# Patient Record
Sex: FEMALE | Race: WHITE | NOT HISPANIC OR LATINO | Employment: UNEMPLOYED | URBAN - NONMETROPOLITAN AREA
[De-identification: names, ages, dates, MRNs, and addresses within clinical notes are randomized per-mention and may not be internally consistent; named-entity substitution may affect disease eponyms.]

---

## 2017-02-06 ENCOUNTER — TRANSCRIBE ORDERS (OUTPATIENT)
Dept: ADMINISTRATIVE | Facility: HOSPITAL | Age: 42
End: 2017-02-06

## 2017-02-06 DIAGNOSIS — Z12.31 VISIT FOR SCREENING MAMMOGRAM: Primary | ICD-10-CM

## 2017-02-14 ENCOUNTER — HOSPITAL ENCOUNTER (OUTPATIENT)
Dept: MAMMOGRAPHY | Facility: HOSPITAL | Age: 42
End: 2017-02-14

## 2017-06-08 ENCOUNTER — TRANSCRIBE ORDERS (OUTPATIENT)
Dept: ADMINISTRATIVE | Facility: HOSPITAL | Age: 42
End: 2017-06-08

## 2017-06-08 DIAGNOSIS — R25.1 TREMOR: Primary | ICD-10-CM

## 2017-06-15 ENCOUNTER — HOSPITAL ENCOUNTER (OUTPATIENT)
Dept: MRI IMAGING | Facility: HOSPITAL | Age: 42
Discharge: HOME | End: 2017-06-15
Attending: PSYCHIATRY & NEUROLOGY

## 2017-07-27 ENCOUNTER — APPOINTMENT (OUTPATIENT)
Dept: MRI IMAGING | Facility: HOSPITAL | Age: 42
End: 2017-07-27
Attending: PSYCHIATRY & NEUROLOGY

## 2017-07-31 ENCOUNTER — HOSPITAL ENCOUNTER (OUTPATIENT)
Dept: MRI IMAGING | Facility: HOSPITAL | Age: 42
Discharge: HOME | End: 2017-07-31
Attending: PSYCHIATRY & NEUROLOGY | Admitting: PSYCHIATRY & NEUROLOGY

## 2017-07-31 DIAGNOSIS — R25.1 TREMOR: ICD-10-CM

## 2017-07-31 PROCEDURE — A9577 INJ MULTIHANCE: HCPCS | Performed by: PSYCHIATRY & NEUROLOGY

## 2017-07-31 PROCEDURE — 0 GADOBENATE DIMEGLUMINE 529 MG/ML SOLUTION: Performed by: PSYCHIATRY & NEUROLOGY

## 2017-07-31 PROCEDURE — 70553 MRI BRAIN STEM W/O & W/DYE: CPT

## 2017-07-31 PROCEDURE — 70553 MRI BRAIN STEM W/O & W/DYE: CPT | Performed by: RADIOLOGY

## 2017-07-31 RX ADMIN — GADOBENATE DIMEGLUMINE 20 ML: 529 INJECTION, SOLUTION INTRAVENOUS at 09:40

## 2018-04-19 ENCOUNTER — HOSPITAL ENCOUNTER (OUTPATIENT)
Dept: GENERAL RADIOLOGY | Facility: HOSPITAL | Age: 43
Discharge: HOME | End: 2018-04-19
Attending: PSYCHIATRY & NEUROLOGY

## 2018-04-19 ENCOUNTER — TRANSCRIBE ORDERS (OUTPATIENT)
Dept: LAB | Facility: HOSPITAL | Age: 43
End: 2018-04-19

## 2018-04-19 ENCOUNTER — HOSPITAL ENCOUNTER (OUTPATIENT)
Dept: GENERAL RADIOLOGY | Facility: HOSPITAL | Age: 43
Discharge: HOME | End: 2018-04-19
Attending: PSYCHIATRY & NEUROLOGY | Admitting: PSYCHIATRY & NEUROLOGY

## 2018-04-19 DIAGNOSIS — M54.9 BACK PAIN, UNSPECIFIED BACK LOCATION, UNSPECIFIED BACK PAIN LATERALITY, UNSPECIFIED CHRONICITY: Primary | ICD-10-CM

## 2018-04-19 DIAGNOSIS — M54.9 BACK PAIN, UNSPECIFIED BACK LOCATION, UNSPECIFIED BACK PAIN LATERALITY, UNSPECIFIED CHRONICITY: ICD-10-CM

## 2018-04-19 DIAGNOSIS — M54.2 NECK PAIN: ICD-10-CM

## 2018-04-19 PROCEDURE — 72110 X-RAY EXAM L-2 SPINE 4/>VWS: CPT | Performed by: RADIOLOGY

## 2018-04-19 PROCEDURE — 72110 X-RAY EXAM L-2 SPINE 4/>VWS: CPT

## 2018-04-19 PROCEDURE — 72050 X-RAY EXAM NECK SPINE 4/5VWS: CPT

## 2018-04-19 PROCEDURE — 72052 X-RAY EXAM NECK SPINE 6/>VWS: CPT | Performed by: RADIOLOGY

## 2018-05-14 ENCOUNTER — OFFICE VISIT (OUTPATIENT)
Dept: ORTHOPEDIC SURGERY | Facility: CLINIC | Age: 43
End: 2018-05-14

## 2018-05-14 VITALS
HEART RATE: 88 BPM | WEIGHT: 280 LBS | SYSTOLIC BLOOD PRESSURE: 122 MMHG | BODY MASS INDEX: 43.95 KG/M2 | HEIGHT: 67 IN | DIASTOLIC BLOOD PRESSURE: 90 MMHG

## 2018-05-14 DIAGNOSIS — G56.03 BILATERAL CARPAL TUNNEL SYNDROME: Primary | ICD-10-CM

## 2018-05-14 PROCEDURE — 99406 BEHAV CHNG SMOKING 3-10 MIN: CPT | Performed by: ORTHOPAEDIC SURGERY

## 2018-05-14 PROCEDURE — 99204 OFFICE O/P NEW MOD 45 MIN: CPT | Performed by: ORTHOPAEDIC SURGERY

## 2018-05-14 RX ORDER — BUPROPION HYDROCHLORIDE 150 MG/1
TABLET ORAL
COMMUNITY
Start: 2018-03-28 | End: 2018-06-05

## 2018-05-14 RX ORDER — ALBUTEROL SULFATE 90 UG/1
2 AEROSOL, METERED RESPIRATORY (INHALATION) EVERY 4 HOURS PRN
COMMUNITY
Start: 2018-05-04

## 2018-05-14 RX ORDER — OMEPRAZOLE 20 MG/1
20 CAPSULE, DELAYED RELEASE ORAL DAILY
COMMUNITY
Start: 2018-05-04

## 2018-05-14 RX ORDER — PRIMIDONE 250 MG/1
62.5 TABLET ORAL 2 TIMES DAILY
COMMUNITY
Start: 2018-03-12

## 2018-05-14 RX ORDER — NABUMETONE 750 MG/1
750 TABLET, FILM COATED ORAL 2 TIMES DAILY PRN
COMMUNITY
Start: 2018-05-04

## 2018-05-14 RX ORDER — FENOFIBRATE 54 MG/1
54 TABLET ORAL DAILY
COMMUNITY
Start: 2018-05-04

## 2018-05-14 RX ORDER — AMITRIPTYLINE HYDROCHLORIDE 25 MG/1
25 TABLET, FILM COATED ORAL NIGHTLY
COMMUNITY
Start: 2018-03-28

## 2018-05-14 RX ORDER — ERGOCALCIFEROL 1.25 MG/1
50000 CAPSULE ORAL
COMMUNITY
Start: 2018-05-04

## 2018-05-14 RX ORDER — LISINOPRIL 10 MG/1
10 TABLET ORAL DAILY
COMMUNITY
Start: 2018-05-04

## 2018-05-14 RX ORDER — GABAPENTIN 600 MG/1
600 TABLET ORAL 3 TIMES DAILY
COMMUNITY
Start: 2018-05-04

## 2018-05-14 RX ORDER — METHOCARBAMOL 750 MG/1
750 TABLET, FILM COATED ORAL 4 TIMES DAILY
COMMUNITY
Start: 2018-05-04

## 2018-05-14 NOTE — PROGRESS NOTES
History and Physical      Patient: Betty Horne  YOB: 1975  Date of Encounter: 05/14/2018      HPI:   Betty Horne, 43 y.o. female, referred today by Faye Whitten evaluation of bilateral hand pain left worse than the right.  Reports symptoms dating back to 2014 describing constant pain which often awakens her from sleep.  She has tried night bracing without improvement.  Experiences tingling and numbness in both hands with associated pain.  He also has discomfort while driving.  She has no history of significant neck pain.    Active Problem List:  Patient Active Problem List   Diagnosis   • Bilateral carpal tunnel syndrome       Past Medical History:  Past Medical History:   Diagnosis Date   • Bilateral wrist pain    • Diabetes    • GERD (gastroesophageal reflux disease)    • High cholesterol    • Hypertension    • Osteoarthritis        Past Surgical History:  Past Surgical History:   Procedure Laterality Date   • CHOLECYSTECTOMY         Family History:  Family History   Problem Relation Age of Onset   • Osteoarthritis Mother    • Osteoporosis Mother    • Heart disease Mother    • Hypertension Mother    • Osteoarthritis Father    • Heart disease Father    • Diabetes Father    • Hypertension Father    • Osteoarthritis Sister    • Osteoarthritis Maternal Grandmother    • Osteoporosis Maternal Grandmother    • Heart disease Maternal Grandmother    • Heart disease Maternal Grandfather    • Hypertension Maternal Grandfather        Social History:  Social History     Social History   • Marital status:      Spouse name: N/A   • Number of children: N/A   • Years of education: N/A     Occupational History   • Not on file.     Social History Main Topics   • Smoking status: Current Some Day Smoker     Packs/day: 1.50     Years: 17.00   • Smokeless tobacco: Never Used   • Alcohol use Yes      Comment: Occasional and Social    • Drug use: No   • Sexual activity: Not on file     Other Topics Concern   •  Not on file     Social History Narrative   • No narrative on file   I advised the patient of the risks in continuing to use tobacco, and I provided this patient with smoking cessation educational materials.    During this visit, I spent 3 minutes counseling the patient regarding smoking cessation.    Patient's Body mass index is 43.85 kg/m². BMI is above normal parameters. Recommendations include: educational material.        Medications:  Current Outpatient Prescriptions   Medication Sig Dispense Refill   • amitriptyline (ELAVIL) 25 MG tablet      • buPROPion XL (WELLBUTRIN XL) 150 MG 24 hr tablet      • fenofibrate (TRICOR) 54 MG tablet      • gabapentin (NEURONTIN) 600 MG tablet      • lisinopril (PRINIVIL,ZESTRIL) 10 MG tablet      • metFORMIN (GLUCOPHAGE) 500 MG tablet      • methocarbamol (ROBAXIN) 750 MG tablet      • mupirocin (BACTROBAN) 2 % ointment      • nabumetone (RELAFEN) 750 MG tablet      • omeprazole (priLOSEC) 20 MG capsule      • primidone (MYSOLINE) 250 MG tablet      • VENTOLIN  (90 Base) MCG/ACT inhaler      • vitamin D (ERGOCALCIFEROL) 29410 units capsule capsule        No current facility-administered medications for this visit.        Allergies:  No Known Allergies    Review of Systems:   Review of Systems   Constitutional: Positive for fatigue.   HENT: Positive for sinus pain.    Eyes: Negative.    Respiratory: Negative.    Cardiovascular: Positive for palpitations (none currently).   Gastrointestinal: Positive for constipation and diarrhea.   Endocrine: Positive for cold intolerance and heat intolerance.   Genitourinary: Positive for frequency.   Musculoskeletal: Positive for arthralgias, back pain, gait problem, joint swelling and myalgias.   Skin: Negative.    Allergic/Immunologic: Negative.    Neurological: Positive for tremors, weakness and numbness.   Psychiatric/Behavioral: Positive for dysphoric mood. The patient is nervous/anxious.        Physical Exam:   Physical Exam  "  Constitutional: She is oriented to person, place, and time. She appears well-developed and well-nourished. No distress.   Eyes: Pupils are equal, round, and reactive to light. Right eye exhibits no discharge. Left eye exhibits no discharge.   Neck: Normal range of motion. Neck supple.   Cardiovascular: Normal rate, normal heart sounds and intact distal pulses.    No murmur heard.  Pulmonary/Chest: Effort normal and breath sounds normal. No respiratory distress. She has no wheezes.   Abdominal: Soft. She exhibits no distension.   Musculoskeletal:   Musculoskeletal:  Bilateral hand evaluation shows slight thenar atrophy on the left compared to the right.  She has slightly diminished  strength.  Phalen's test is negative bilaterally Tinel's test is negative.  She demonstrates decreased sensation primarily to the index and middle fingers.    Neurological: She is alert and oriented to person, place, and time.   Skin: Skin is warm and dry. Capillary refill takes less than 2 seconds. She is not diaphoretic.   Psychiatric: She has a normal mood and affect. Her behavior is normal. Judgment and thought content normal.     GENERAL: 43 y.o. female, alert and oriented X 3 in no acute distress.   Visit Vitals  /90   Pulse 88   Ht 170.2 cm (67\")   Wt 127 kg (280 lb)   BMI 43.85 kg/m²       Assessment & Plan:  43 y.o. female with bilateral carpal tunnel syndrome worse on the left.  EMG nerve conduction studies confirm median neuropathy bilateral wrist which has progressed since EMG nerve conduction studies completed May 2, 2017.  Today we discussed her options. She understands risks and benefits and wished to proceed with surgery. She is scheduled for carpal tunnel release left hand on June 7, 2018.       ICD-10-CM ICD-9-CM   1. Bilateral carpal tunnel syndrome G56.03 354.0     Cc:   Faye Whitten PA-C          Scribed for Miki Holt MD by Tiffany Holt RN.9:45 AM 05/14/2018                  "

## 2018-05-16 ENCOUNTER — TELEPHONE (OUTPATIENT)
Dept: ORTHOPEDIC SURGERY | Facility: CLINIC | Age: 43
End: 2018-05-16

## 2018-05-16 NOTE — TELEPHONE ENCOUNTER
Patient is aware of PAT date/time 6-5-18@12:45. Patient stated that she might have to change this appointment due to another appointment.

## 2018-05-24 ENCOUNTER — PREP FOR SURGERY (OUTPATIENT)
Dept: OTHER | Facility: HOSPITAL | Age: 43
End: 2018-05-24

## 2018-05-24 NOTE — H&P
History and Physical      Patient: Betty Horne  YOB: 1975  Date of Encounter: 05/14/2018      HPI:   Betty Horne, 43 y.o. female, referred today by Faye Whitten evaluation of bilateral hand pain left worse than the right.  Reports symptoms dating back to 2014 describing constant pain which often awakens her from sleep.  She has tried night bracing without improvement.  Experiences tingling and numbness in both hands with associated pain.  He also has discomfort while driving.  She has no history of significant neck pain.    Active Problem List:  Patient Active Problem List   Diagnosis   • Bilateral carpal tunnel syndrome       Past Medical History:  Past Medical History:   Diagnosis Date   • Bilateral wrist pain    • Diabetes    • GERD (gastroesophageal reflux disease)    • High cholesterol    • Hypertension    • Osteoarthritis        Past Surgical History:  Past Surgical History:   Procedure Laterality Date   • CHOLECYSTECTOMY         Family History:  Family History   Problem Relation Age of Onset   • Osteoarthritis Mother    • Osteoporosis Mother    • Heart disease Mother    • Hypertension Mother    • Osteoarthritis Father    • Heart disease Father    • Diabetes Father    • Hypertension Father    • Osteoarthritis Sister    • Osteoarthritis Maternal Grandmother    • Osteoporosis Maternal Grandmother    • Heart disease Maternal Grandmother    • Heart disease Maternal Grandfather    • Hypertension Maternal Grandfather        Social History:  Social History     Social History   • Marital status:      Spouse name: N/A   • Number of children: N/A   • Years of education: N/A     Occupational History   • Not on file.     Social History Main Topics   • Smoking status: Current Some Day Smoker     Packs/day: 1.50     Years: 17.00   • Smokeless tobacco: Never Used   • Alcohol use Yes      Comment: Occasional and Social    • Drug use: No   • Sexual activity: Not on file     Other Topics Concern   •  Not on file     Social History Narrative   • No narrative on file   I advised the patient of the risks in continuing to use tobacco, and I provided this patient with smoking cessation educational materials.    During this visit, I spent 3 minutes counseling the patient regarding smoking cessation.    Patient's Body mass index is 43.85 kg/m². BMI is above normal parameters. Recommendations include: educational material.        Medications:  Current Outpatient Prescriptions   Medication Sig Dispense Refill   • amitriptyline (ELAVIL) 25 MG tablet      • buPROPion XL (WELLBUTRIN XL) 150 MG 24 hr tablet      • fenofibrate (TRICOR) 54 MG tablet      • gabapentin (NEURONTIN) 600 MG tablet      • lisinopril (PRINIVIL,ZESTRIL) 10 MG tablet      • metFORMIN (GLUCOPHAGE) 500 MG tablet      • methocarbamol (ROBAXIN) 750 MG tablet      • mupirocin (BACTROBAN) 2 % ointment      • nabumetone (RELAFEN) 750 MG tablet      • omeprazole (priLOSEC) 20 MG capsule      • primidone (MYSOLINE) 250 MG tablet      • VENTOLIN  (90 Base) MCG/ACT inhaler      • vitamin D (ERGOCALCIFEROL) 69030 units capsule capsule        No current facility-administered medications for this visit.        Allergies:  No Known Allergies    Review of Systems:   Review of Systems   Constitutional: Positive for fatigue.   HENT: Positive for sinus pain.    Eyes: Negative.    Respiratory: Negative.    Cardiovascular: Positive for palpitations (none currently).   Gastrointestinal: Positive for constipation and diarrhea.   Endocrine: Positive for cold intolerance and heat intolerance.   Genitourinary: Positive for frequency.   Musculoskeletal: Positive for arthralgias, back pain, gait problem, joint swelling and myalgias.   Skin: Negative.    Allergic/Immunologic: Negative.    Neurological: Positive for tremors, weakness and numbness.   Psychiatric/Behavioral: Positive for dysphoric mood. The patient is nervous/anxious.        Physical Exam:   Physical Exam  "  Constitutional: She is oriented to person, place, and time. She appears well-developed and well-nourished. No distress.   Eyes: Pupils are equal, round, and reactive to light. Right eye exhibits no discharge. Left eye exhibits no discharge.   Neck: Normal range of motion. Neck supple.   Cardiovascular: Normal rate, normal heart sounds and intact distal pulses.    No murmur heard.  Pulmonary/Chest: Effort normal and breath sounds normal. No respiratory distress. She has no wheezes.   Abdominal: Soft. She exhibits no distension.   Musculoskeletal:   Musculoskeletal:  Bilateral hand evaluation shows slight thenar atrophy on the left compared to the right.  She has slightly diminished  strength.  Phalen's test is negative bilaterally Tinel's test is negative.  She demonstrates decreased sensation primarily to the index and middle fingers.    Neurological: She is alert and oriented to person, place, and time.   Skin: Skin is warm and dry. Capillary refill takes less than 2 seconds. She is not diaphoretic.   Psychiatric: She has a normal mood and affect. Her behavior is normal. Judgment and thought content normal.     GENERAL: 43 y.o. female, alert and oriented X 3 in no acute distress.   Visit Vitals  /90   Pulse 88   Ht 170.2 cm (67\")   Wt 127 kg (280 lb)   BMI 43.85 kg/m²       Assessment & Plan:  43 y.o. female with bilateral carpal tunnel syndrome worse on the left.  EMG nerve conduction studies confirm median neuropathy bilateral wrist which has progressed since EMG nerve conduction studies completed May 2, 2017.  Today we discussed her options. She understands risks and benefits and wished to proceed with surgery. She is scheduled for carpal tunnel release left hand on June 7, 2018.       ICD-10-CM ICD-9-CM   1. Bilateral carpal tunnel syndrome G56.03 354.0     Cc:   Faye Whitten PA-C          Scribed for Miki Holt MD by Tiffany Holt RN.9:45 AM 05/14/2018    "

## 2018-06-05 ENCOUNTER — APPOINTMENT (OUTPATIENT)
Dept: PREADMISSION TESTING | Facility: HOSPITAL | Age: 43
End: 2018-06-05

## 2018-06-05 LAB
ANION GAP SERPL CALCULATED.3IONS-SCNC: 1.9 MMOL/L (ref 3.6–11.2)
BUN BLD-MCNC: 10 MG/DL (ref 7–21)
BUN/CREAT SERPL: 15.4 (ref 7–25)
CALCIUM SPEC-SCNC: 9.5 MG/DL (ref 7.7–10)
CHLORIDE SERPL-SCNC: 109 MMOL/L (ref 99–112)
CO2 SERPL-SCNC: 27.1 MMOL/L (ref 24.3–31.9)
CREAT BLD-MCNC: 0.65 MG/DL (ref 0.43–1.29)
DEPRECATED RDW RBC AUTO: 40 FL (ref 37–54)
ERYTHROCYTE [DISTWIDTH] IN BLOOD BY AUTOMATED COUNT: 12.5 % (ref 11.5–14.5)
GFR SERPL CREATININE-BSD FRML MDRD: 100 ML/MIN/1.73
GLUCOSE BLD-MCNC: 76 MG/DL (ref 70–110)
HCT VFR BLD AUTO: 44.3 % (ref 37–47)
HGB BLD-MCNC: 15.3 G/DL (ref 12–16)
MCH RBC QN AUTO: 30.8 PG (ref 27–33)
MCHC RBC AUTO-ENTMCNC: 34.5 G/DL (ref 33–37)
MCV RBC AUTO: 89.1 FL (ref 80–94)
OSMOLALITY SERPL CALC.SUM OF ELEC: 273.5 MOSM/KG (ref 273–305)
PLATELET # BLD AUTO: 251 10*3/MM3 (ref 130–400)
PMV BLD AUTO: 11.6 FL (ref 6–10)
POTASSIUM BLD-SCNC: 4.1 MMOL/L (ref 3.5–5.3)
RBC # BLD AUTO: 4.97 10*6/MM3 (ref 4.2–5.4)
SODIUM BLD-SCNC: 138 MMOL/L (ref 135–153)
WBC NRBC COR # BLD: 10.14 10*3/MM3 (ref 4.5–12.5)

## 2018-06-05 PROCEDURE — 36415 COLL VENOUS BLD VENIPUNCTURE: CPT

## 2018-06-05 PROCEDURE — 93010 ELECTROCARDIOGRAM REPORT: CPT | Performed by: INTERNAL MEDICINE

## 2018-06-05 PROCEDURE — 80048 BASIC METABOLIC PNL TOTAL CA: CPT | Performed by: ORTHOPAEDIC SURGERY

## 2018-06-05 PROCEDURE — 93005 ELECTROCARDIOGRAM TRACING: CPT

## 2018-06-05 PROCEDURE — 85027 COMPLETE CBC AUTOMATED: CPT | Performed by: ORTHOPAEDIC SURGERY

## 2018-06-05 NOTE — DISCHARGE INSTRUCTIONS
TAKE the following medications the morning of surgery:  All heart or blood pressure medications    Please discontinue all blood thinners and anticoagulants (except aspirin) prior to surgery as per your surgeon and cardiologist instructions.  Aspirin may be continued up to the day prior to surgery.    HOLD all diabetic medications the morning of surgery as order by physician.    Please follow instructions on use of prep cloths provided by nurse. Return instruction sheet with stickers attached to pre-op nurse on day of surgery.    Arrival time for surgery on 6/7/2018 is 0930 am.    General Instructions:  • Do NOT eat or drink after midnight which includes water, mints, or gum.  • You may brush your teeth. Dental appliances that are removable must be taken out day of surgery.  • Do NOT smoke, chew tobacco, or drink alcohol within 24 hours prior to surgery.  • Bring medications in original bottles, any inhalers and if applicable your C-PAP/BI-PAP machine  • Bring any papers given to you in the doctor’s office  • Wear clean, comfortable clothes and socks  • Do NOT wear contact lenses or make-up or dark nail polish.  Bring a case for your glasses if applicable.  • Bring crutches or walker if applicable  • Leave all other valuables and jewelry at home  • If you were given a blood bank armband, continue to wear it until discharged.    Preventing a Surgical Site Infection:  • Shower the night before surgery (unless instructed otherwise) using a fresh bar of anti-bacterial soap (such as Dial) and clean washcloth.  Dry with a clean towel and dress in clean clothing.  • For 2 to 3 days before surgery, avoid shaving with a razor near where you will have surgery because the razor can irritate skin and make it easier to develop an infection.  Ask your surgeon if you will be receiving antibiotics prior to surgery.  • Make sure you, your family, and all healthcare providers clean their hands with soap and water or an alcohol-based  hand  before caring for you or your wound.  • If at all possible, quit smoking as many days before surgery as you can.    Day of Surgery:  Upon arrival, a pre-op nurse and anesthesiologist will review your health history, obtain vital signs, and answer questions you may have.  The only belongings needed at this time will be your home medications and if applicable you C-PAP/BI-PAP machine.  If you are staying overnight, your family can leave the rest of your belongings in the car and bring them to your room later.  A pre-op nurse will start an IV and you may receive medication in preparation for surgery.  Due to patient privacy and limited space, only one member of your family will be able to accompany you in the pre-op area.  While you are in surgery your family should notify the waiting room  if they leave the waiting room area and provide a contact number.  Please be aware that surgery does come with discomfort.  We want to make every effort to control your discomfort so please discuss any uncontrolled symptoms with your nurse.  Your doctor will most likely have prescribed pain medications.  If you are going home after surgery you will receive individualized written care instructions before being discharged.  A responsible adult must drive you to and from the hospital on the day of surgery and stay with you for 24 hours.  If you are staying overnight following surgery, you will be transported to your hospital room following the recovery period.

## 2018-06-06 ENCOUNTER — TELEPHONE (OUTPATIENT)
Dept: ORTHOPEDIC SURGERY | Facility: CLINIC | Age: 43
End: 2018-06-06

## 2018-06-07 ENCOUNTER — ANESTHESIA EVENT (OUTPATIENT)
Dept: PERIOP | Facility: HOSPITAL | Age: 43
End: 2018-06-07

## 2018-06-07 ENCOUNTER — ANESTHESIA (OUTPATIENT)
Dept: PERIOP | Facility: HOSPITAL | Age: 43
End: 2018-06-07

## 2018-06-07 ENCOUNTER — HOSPITAL ENCOUNTER (OUTPATIENT)
Facility: HOSPITAL | Age: 43
Setting detail: HOSPITAL OUTPATIENT SURGERY
Discharge: HOME | End: 2018-06-07
Attending: ORTHOPAEDIC SURGERY | Admitting: ORTHOPAEDIC SURGERY

## 2018-06-07 VITALS
WEIGHT: 272 LBS | HEART RATE: 73 BPM | RESPIRATION RATE: 16 BRPM | SYSTOLIC BLOOD PRESSURE: 111 MMHG | BODY MASS INDEX: 42.69 KG/M2 | HEIGHT: 67 IN | DIASTOLIC BLOOD PRESSURE: 64 MMHG | TEMPERATURE: 97.7 F | OXYGEN SATURATION: 97 %

## 2018-06-07 DIAGNOSIS — G56.03 BILATERAL CARPAL TUNNEL SYNDROME: Primary | ICD-10-CM

## 2018-06-07 LAB
B-HCG UR QL: NEGATIVE
GLUCOSE BLDC GLUCOMTR-MCNC: 90 MG/DL (ref 70–130)
INTERNAL NEGATIVE CONTROL: NEGATIVE
INTERNAL POSITIVE CONTROL: POSITIVE
Lab: NORMAL

## 2018-06-07 PROCEDURE — 82962 GLUCOSE BLOOD TEST: CPT

## 2018-06-07 PROCEDURE — 25010000002 FENTANYL CITRATE (PF) 100 MCG/2ML SOLUTION: Performed by: NURSE ANESTHETIST, CERTIFIED REGISTERED

## 2018-06-07 PROCEDURE — 94799 UNLISTED PULMONARY SVC/PX: CPT

## 2018-06-07 PROCEDURE — 25010000002 PROPOFOL 10 MG/ML EMULSION: Performed by: NURSE ANESTHETIST, CERTIFIED REGISTERED

## 2018-06-07 PROCEDURE — 64721 CARPAL TUNNEL SURGERY: CPT | Performed by: ORTHOPAEDIC SURGERY

## 2018-06-07 PROCEDURE — 25010000002 ONDANSETRON PER 1 MG: Performed by: NURSE ANESTHETIST, CERTIFIED REGISTERED

## 2018-06-07 PROCEDURE — 25010000002 MIDAZOLAM PER 1 MG: Performed by: NURSE ANESTHETIST, CERTIFIED REGISTERED

## 2018-06-07 RX ORDER — LIDOCAINE HYDROCHLORIDE 20 MG/ML
INJECTION, SOLUTION EPIDURAL; INFILTRATION; INTRACAUDAL; PERINEURAL AS NEEDED
Status: DISCONTINUED | OUTPATIENT
Start: 2018-06-07 | End: 2018-06-07 | Stop reason: SURG

## 2018-06-07 RX ORDER — FENTANYL CITRATE 50 UG/ML
50 INJECTION, SOLUTION INTRAMUSCULAR; INTRAVENOUS
Status: DISCONTINUED | OUTPATIENT
Start: 2018-06-07 | End: 2018-06-07 | Stop reason: HOSPADM

## 2018-06-07 RX ORDER — OXYCODONE HYDROCHLORIDE AND ACETAMINOPHEN 5; 325 MG/1; MG/1
1-2 TABLET ORAL EVERY 4 HOURS PRN
Qty: 20 TABLET | Refills: 0 | Status: SHIPPED | OUTPATIENT
Start: 2018-06-07

## 2018-06-07 RX ORDER — FENTANYL CITRATE 50 UG/ML
INJECTION, SOLUTION INTRAMUSCULAR; INTRAVENOUS AS NEEDED
Status: DISCONTINUED | OUTPATIENT
Start: 2018-06-07 | End: 2018-06-07 | Stop reason: SURG

## 2018-06-07 RX ORDER — BUPIVACAINE HYDROCHLORIDE 5 MG/ML
INJECTION, SOLUTION PERINEURAL AS NEEDED
Status: DISCONTINUED | OUTPATIENT
Start: 2018-06-07 | End: 2018-06-07 | Stop reason: HOSPADM

## 2018-06-07 RX ORDER — ONDANSETRON 2 MG/ML
4 INJECTION INTRAMUSCULAR; INTRAVENOUS ONCE AS NEEDED
Status: DISCONTINUED | OUTPATIENT
Start: 2018-06-07 | End: 2018-06-07 | Stop reason: HOSPADM

## 2018-06-07 RX ORDER — SODIUM CHLORIDE, SODIUM LACTATE, POTASSIUM CHLORIDE, CALCIUM CHLORIDE 600; 310; 30; 20 MG/100ML; MG/100ML; MG/100ML; MG/100ML
125 INJECTION, SOLUTION INTRAVENOUS CONTINUOUS
Status: DISCONTINUED | OUTPATIENT
Start: 2018-06-07 | End: 2018-06-07 | Stop reason: HOSPADM

## 2018-06-07 RX ORDER — IPRATROPIUM BROMIDE AND ALBUTEROL SULFATE 2.5; .5 MG/3ML; MG/3ML
3 SOLUTION RESPIRATORY (INHALATION) ONCE AS NEEDED
Status: DISCONTINUED | OUTPATIENT
Start: 2018-06-07 | End: 2018-06-07 | Stop reason: HOSPADM

## 2018-06-07 RX ORDER — MAGNESIUM HYDROXIDE 1200 MG/15ML
LIQUID ORAL AS NEEDED
Status: DISCONTINUED | OUTPATIENT
Start: 2018-06-07 | End: 2018-06-07 | Stop reason: HOSPADM

## 2018-06-07 RX ORDER — ONDANSETRON 2 MG/ML
INJECTION INTRAMUSCULAR; INTRAVENOUS AS NEEDED
Status: DISCONTINUED | OUTPATIENT
Start: 2018-06-07 | End: 2018-06-07 | Stop reason: SURG

## 2018-06-07 RX ORDER — MEPERIDINE HYDROCHLORIDE 50 MG/ML
12.5 INJECTION INTRAMUSCULAR; INTRAVENOUS; SUBCUTANEOUS
Status: DISCONTINUED | OUTPATIENT
Start: 2018-06-07 | End: 2018-06-07 | Stop reason: HOSPADM

## 2018-06-07 RX ORDER — LIDOCAINE HYDROCHLORIDE 10 MG/ML
INJECTION, SOLUTION INFILTRATION; PERINEURAL AS NEEDED
Status: DISCONTINUED | OUTPATIENT
Start: 2018-06-07 | End: 2018-06-07 | Stop reason: HOSPADM

## 2018-06-07 RX ORDER — PROPOFOL 10 MG/ML
VIAL (ML) INTRAVENOUS AS NEEDED
Status: DISCONTINUED | OUTPATIENT
Start: 2018-06-07 | End: 2018-06-07 | Stop reason: SURG

## 2018-06-07 RX ORDER — SODIUM CHLORIDE 0.9 % (FLUSH) 0.9 %
1-10 SYRINGE (ML) INJECTION AS NEEDED
Status: DISCONTINUED | OUTPATIENT
Start: 2018-06-07 | End: 2018-06-07 | Stop reason: HOSPADM

## 2018-06-07 RX ORDER — MIDAZOLAM HYDROCHLORIDE 1 MG/ML
INJECTION INTRAMUSCULAR; INTRAVENOUS AS NEEDED
Status: DISCONTINUED | OUTPATIENT
Start: 2018-06-07 | End: 2018-06-07 | Stop reason: SURG

## 2018-06-07 RX ORDER — OXYCODONE HYDROCHLORIDE AND ACETAMINOPHEN 5; 325 MG/1; MG/1
1 TABLET ORAL ONCE AS NEEDED
Status: DISCONTINUED | OUTPATIENT
Start: 2018-06-07 | End: 2018-06-07 | Stop reason: HOSPADM

## 2018-06-07 RX ADMIN — PROPOFOL 50 MG: 10 INJECTION, EMULSION INTRAVENOUS at 10:05

## 2018-06-07 RX ADMIN — FENTANYL CITRATE 25 MCG: 50 INJECTION INTRAMUSCULAR; INTRAVENOUS at 10:02

## 2018-06-07 RX ADMIN — FENTANYL CITRATE 25 MCG: 50 INJECTION INTRAMUSCULAR; INTRAVENOUS at 10:11

## 2018-06-07 RX ADMIN — SODIUM CHLORIDE, POTASSIUM CHLORIDE, SODIUM LACTATE AND CALCIUM CHLORIDE 125 ML/HR: 600; 310; 30; 20 INJECTION, SOLUTION INTRAVENOUS at 08:34

## 2018-06-07 RX ADMIN — FENTANYL CITRATE 50 MCG: 50 INJECTION INTRAMUSCULAR; INTRAVENOUS at 09:56

## 2018-06-07 RX ADMIN — ONDANSETRON 4 MG: 2 INJECTION, SOLUTION INTRAMUSCULAR; INTRAVENOUS at 09:53

## 2018-06-07 RX ADMIN — MIDAZOLAM HYDROCHLORIDE 2 MG: 1 INJECTION, SOLUTION INTRAMUSCULAR; INTRAVENOUS at 09:53

## 2018-06-07 RX ADMIN — LIDOCAINE HYDROCHLORIDE 60 MG: 20 INJECTION, SOLUTION EPIDURAL; INFILTRATION; INTRACAUDAL; PERINEURAL at 10:05

## 2018-06-07 RX ADMIN — PROPOFOL 50 MG: 10 INJECTION, EMULSION INTRAVENOUS at 10:10

## 2018-06-07 NOTE — ANESTHESIA PREPROCEDURE EVALUATION
Anesthesia Evaluation                  Airway   Mallampati: III  TM distance: >3 FB  Neck ROM: limited  Possible difficult intubation  Dental - normal exam     Pulmonary - normal exam   Cardiovascular - normal exam    (+) hypertension, hyperlipidemia,       Neuro/Psych  (+) numbness,     GI/Hepatic/Renal/Endo    (+) morbid obesity, GERD,  diabetes mellitus,     Musculoskeletal     Abdominal  - normal exam    Bowel sounds: normal.   Substance History      OB/GYN          Other   (+) arthritis                     Anesthesia Plan    ASA 3     general     intravenous induction   Anesthetic plan and risks discussed with patient.    Plan discussed with CRNA.

## 2018-06-07 NOTE — H&P (VIEW-ONLY)
History and Physical      Patient: Betty Horne  YOB: 1975  Date of Encounter: 05/14/2018      HPI:   Betty Horne, 43 y.o. female, referred today by Faye Whitten evaluation of bilateral hand pain left worse than the right.  Reports symptoms dating back to 2014 describing constant pain which often awakens her from sleep.  She has tried night bracing without improvement.  Experiences tingling and numbness in both hands with associated pain.  He also has discomfort while driving.  She has no history of significant neck pain.    Active Problem List:  Patient Active Problem List   Diagnosis   • Bilateral carpal tunnel syndrome       Past Medical History:  Past Medical History:   Diagnosis Date   • Bilateral wrist pain    • Diabetes    • GERD (gastroesophageal reflux disease)    • High cholesterol    • Hypertension    • Osteoarthritis        Past Surgical History:  Past Surgical History:   Procedure Laterality Date   • CHOLECYSTECTOMY         Family History:  Family History   Problem Relation Age of Onset   • Osteoarthritis Mother    • Osteoporosis Mother    • Heart disease Mother    • Hypertension Mother    • Osteoarthritis Father    • Heart disease Father    • Diabetes Father    • Hypertension Father    • Osteoarthritis Sister    • Osteoarthritis Maternal Grandmother    • Osteoporosis Maternal Grandmother    • Heart disease Maternal Grandmother    • Heart disease Maternal Grandfather    • Hypertension Maternal Grandfather        Social History:  Social History     Social History   • Marital status:      Spouse name: N/A   • Number of children: N/A   • Years of education: N/A     Occupational History   • Not on file.     Social History Main Topics   • Smoking status: Current Some Day Smoker     Packs/day: 1.50     Years: 17.00   • Smokeless tobacco: Never Used   • Alcohol use Yes      Comment: Occasional and Social    • Drug use: No   • Sexual activity: Not on file     Other Topics Concern   •  Not on file     Social History Narrative   • No narrative on file   I advised the patient of the risks in continuing to use tobacco, and I provided this patient with smoking cessation educational materials.    During this visit, I spent 3 minutes counseling the patient regarding smoking cessation.    Patient's Body mass index is 43.85 kg/m². BMI is above normal parameters. Recommendations include: educational material.        Medications:  Current Outpatient Prescriptions   Medication Sig Dispense Refill   • amitriptyline (ELAVIL) 25 MG tablet      • buPROPion XL (WELLBUTRIN XL) 150 MG 24 hr tablet      • fenofibrate (TRICOR) 54 MG tablet      • gabapentin (NEURONTIN) 600 MG tablet      • lisinopril (PRINIVIL,ZESTRIL) 10 MG tablet      • metFORMIN (GLUCOPHAGE) 500 MG tablet      • methocarbamol (ROBAXIN) 750 MG tablet      • mupirocin (BACTROBAN) 2 % ointment      • nabumetone (RELAFEN) 750 MG tablet      • omeprazole (priLOSEC) 20 MG capsule      • primidone (MYSOLINE) 250 MG tablet      • VENTOLIN  (90 Base) MCG/ACT inhaler      • vitamin D (ERGOCALCIFEROL) 68676 units capsule capsule        No current facility-administered medications for this visit.        Allergies:  No Known Allergies    Review of Systems:   Review of Systems   Constitutional: Positive for fatigue.   HENT: Positive for sinus pain.    Eyes: Negative.    Respiratory: Negative.    Cardiovascular: Positive for palpitations (none currently).   Gastrointestinal: Positive for constipation and diarrhea.   Endocrine: Positive for cold intolerance and heat intolerance.   Genitourinary: Positive for frequency.   Musculoskeletal: Positive for arthralgias, back pain, gait problem, joint swelling and myalgias.   Skin: Negative.    Allergic/Immunologic: Negative.    Neurological: Positive for tremors, weakness and numbness.   Psychiatric/Behavioral: Positive for dysphoric mood. The patient is nervous/anxious.        Physical Exam:   Physical Exam  "  Constitutional: She is oriented to person, place, and time. She appears well-developed and well-nourished. No distress.   Eyes: Pupils are equal, round, and reactive to light. Right eye exhibits no discharge. Left eye exhibits no discharge.   Neck: Normal range of motion. Neck supple.   Cardiovascular: Normal rate, normal heart sounds and intact distal pulses.    No murmur heard.  Pulmonary/Chest: Effort normal and breath sounds normal. No respiratory distress. She has no wheezes.   Abdominal: Soft. She exhibits no distension.   Musculoskeletal:   Musculoskeletal:  Bilateral hand evaluation shows slight thenar atrophy on the left compared to the right.  She has slightly diminished  strength.  Phalen's test is negative bilaterally Tinel's test is negative.  She demonstrates decreased sensation primarily to the index and middle fingers.    Neurological: She is alert and oriented to person, place, and time.   Skin: Skin is warm and dry. Capillary refill takes less than 2 seconds. She is not diaphoretic.   Psychiatric: She has a normal mood and affect. Her behavior is normal. Judgment and thought content normal.     GENERAL: 43 y.o. female, alert and oriented X 3 in no acute distress.   Visit Vitals  /90   Pulse 88   Ht 170.2 cm (67\")   Wt 127 kg (280 lb)   BMI 43.85 kg/m²       Assessment & Plan:  43 y.o. female with bilateral carpal tunnel syndrome worse on the left.  EMG nerve conduction studies confirm median neuropathy bilateral wrist which has progressed since EMG nerve conduction studies completed May 2, 2017.  Today we discussed her options. She understands risks and benefits and wished to proceed with surgery. She is scheduled for carpal tunnel release left hand on June 7, 2018.       ICD-10-CM ICD-9-CM   1. Bilateral carpal tunnel syndrome G56.03 354.0     Cc:   Faye Whitten PA-C          Scribed for Miki Holt MD by Tiffany Holt RN.9:45 AM 05/14/2018    "

## 2018-06-07 NOTE — BRIEF OP NOTE
CARPAL TUNNEL RELEASE  Progress Note    Betty Ozzie  6/7/2018    Pre-op Diagnosis:   Bilateral carpal tunnel syndrome        Post-Op Diagnosis Codes:     * Bilateral carpal tunnel syndrome     Procedure/CPT® Codes:      Procedure(s):  CARPAL TUNNEL RELEASE left hand    Surgeon(s):  Miki Holt MD    Anesthesia: Monitor Anesthesia Care/local    Staff:   Circulator: Ranjith Larkin RN  Scrub Person: Misty Hearn  Assistant: Nelson Flaherty    Estimated Blood Loss:     Urine Voided: * No values recorded between 6/7/2018  9:56 AM and 6/7/2018 10:29 AM *    Specimens:                      Drains:      Findings:     Complications:       Miki Holt MD     Date: 6/7/2018  Time: 10:29 AM

## 2018-06-07 NOTE — OP NOTE
DATE OF SURGERY: 06/07/18    PREOPERATIVE DIAGNOSIS: Carpal tunnel syndrome left hand     POSTOPERATIVE DIAGNOSIS: Same     OPERATIONS PERFORMED: Carpal tunnel release left hand     SURGEON: Miki Holt MD      ASSISTANT: Elian Matias     ANESTHESIA: Gen./local     ESTIMATED BLOOD LOSS: None     ANTIBIOTICS: None     DESCRIPTION OF PROCEDURE: With patient in the operating theater general anesthetic administered with IV sedation tourniquet applied to left upper arm left hand and arm sterilely prepped draped usual manner exsanguinated tourniquet inflated to 250 mmHg.  Palmar aspect left hand was infiltrated with 5 cc of 0.5 Marcaine.  Longitudinal incision was then made 5 cm in length in line with the fourth finger carried through skin sharply.  Palmar fascia was exposed and divided longitudinally in line with the skin incision.  Underlying transverse carpal ligament was exposed and divided from distal to proximal protecting the underlying median nerve.  Once transverse carpal ligament completely divided median nerve inspected tourniquet inflated median nerve mildly hyperemic.  Hemostasis was obtained with electrocautery the wound closed in a single layer 3-0 nylon vertical mattress suture sterile dressing applied and patient taken to recovery room in stable condition.       Dictator Signature:___________________________  Miki Holt M.D.

## 2018-06-07 NOTE — ANESTHESIA POSTPROCEDURE EVALUATION
Patient: Betty Horne    Procedure Summary     Date:  06/07/18 Room / Location:  [facility] OR 03 /  [facility] OR    Anesthesia Start:  0953 Anesthesia Stop:  1035    Procedure:  CARPAL TUNNEL RELEASE (Left Wrist) Diagnosis:       Bilateral carpal tunnel syndrome      (Bilateral carpal tunnel syndrome [G56.03])    Surgeon:  Miki Holt MD Provider:  Elian Phillips DO    Anesthesia Type:  general ASA Status:  3          Anesthesia Type: general  Last vitals  BP   107/54 (06/07/18 1103)   Temp   97.6 °F (36.4 °C) (06/07/18 1103)   Pulse   65 (06/07/18 1103)   Resp   16 (06/07/18 1103)     SpO2   98 % (06/07/18 1103)     Post Anesthesia Care and Evaluation    Patient location during evaluation: bedside  Patient participation: complete - patient participated  Level of consciousness: awake and alert  Pain score: 1  Pain management: adequate  Airway patency: patent  Anesthetic complications: No anesthetic complications  PONV Status: none  Cardiovascular status: acceptable  Respiratory status: acceptable  Hydration status: acceptable

## 2018-06-18 ENCOUNTER — OFFICE VISIT (OUTPATIENT)
Dept: ORTHOPEDIC SURGERY | Facility: CLINIC | Age: 43
End: 2018-06-18

## 2018-06-18 DIAGNOSIS — Z98.890 S/P CARPAL TUNNEL RELEASE: Primary | ICD-10-CM

## 2018-06-18 DIAGNOSIS — G56.03 BILATERAL CARPAL TUNNEL SYNDROME: ICD-10-CM

## 2018-06-18 PROCEDURE — 99024 POSTOP FOLLOW-UP VISIT: CPT | Performed by: ORTHOPAEDIC SURGERY

## 2018-06-18 NOTE — PROGRESS NOTES
Betty Horne   :1975    Date of encounter:2018        HPI:  Betty Horne is a 43 y.o. female who returns here today status post carpal tunnel release of the left wrist.  Date of surgery was 2018.  She is now 11 days postop.  She states that the numbness and tingling have almost completely subsided.  She complains of mild pain along the incision and in the palm of her hand.    PMH:   Patient Active Problem List   Diagnosis   • Bilateral carpal tunnel syndrome       Exam:  General Appearance:    43 y.o. female  cooperative, in no acute distress.  Alert and oriented x 3,                 There were no vitals filed for this visit.       There is no height or weight on file to calculate BMI.     Examination of the left wrist reveals a clean and dry incision.  There is no surrounding erythema no active drainage.  She has good motion.  Her neurovascular status is intact.      Assessment    ICD-10-CM ICD-9-CM   1. S/P carpal tunnel release Z98.890 V45.89   2. Bilateral carpal tunnel syndrome G56.03 354.0       Plan:   A 43-year-old female 11 days status post carpal tunnel release of the left wrist.  Her incision looks good without signs of infection and sutures were removed today.  She was advised to wear her cockup wrist brace with activities.  She can begin gentle range of motion exercises.  We will see her back in 4 weeks for reevaluation.    Written by, Phuong COOPER, acting as a scribe for Dr. Holt

## 2024-09-24 ENCOUNTER — TRANSCRIBE ORDERS (OUTPATIENT)
Dept: ADMINISTRATIVE | Facility: HOSPITAL | Age: 49
End: 2024-09-24
Payer: COMMERCIAL

## 2024-09-24 DIAGNOSIS — Z12.31 VISIT FOR SCREENING MAMMOGRAM: Primary | ICD-10-CM

## 2025-08-27 ENCOUNTER — TRANSCRIBE ORDERS (OUTPATIENT)
Dept: ADMINISTRATIVE | Facility: HOSPITAL | Age: 50
End: 2025-08-27
Payer: COMMERCIAL

## 2025-08-27 DIAGNOSIS — Z12.31 ENCOUNTER FOR SCREENING MAMMOGRAM FOR MALIGNANT NEOPLASM OF BREAST: Primary | ICD-10-CM

## (undated) DEVICE — HOLDER: Brand: DEROYAL

## (undated) DEVICE — DISPOSABLE TOURNIQUET CUFF SINGLE BLADDER, SINGLE PORT AND LUER LOCK CONNECTOR: Brand: COLOR CUFF

## (undated) DEVICE — BNDG ELAS W/CLIP 3IN 5YD LF STRL

## (undated) DEVICE — SPNG GZ WOVN 4X4IN 12PLY 10/BX STRL

## (undated) DEVICE — DRSNG ADAPTIC 3X8

## (undated) DEVICE — ENCORE® LATEX MICRO SIZE 7.5, STERILE LATEX POWDER-FREE SURGICAL GLOVE: Brand: ENCORE

## (undated) DEVICE — APPL CHLORAPREP W/TINT 26ML ORNG

## (undated) DEVICE — SUT ETHLN 3/0 FS1 663G

## (undated) DEVICE — PK EXTREM UPPR 70

## (undated) DEVICE — BNDG ELAS MATRX V/CLS 3INX5YD LF

## (undated) DEVICE — BNDG ELAS MATRX V/CLS 2INX5YD LF

## (undated) DEVICE — SPLNT ORTHOGLASS 3INX15FT SN

## (undated) DEVICE — UNDERCAST PADDING: Brand: DEROYAL

## (undated) DEVICE — PAD GRND REM POLYHESIVE A/ DISP